# Patient Record
Sex: MALE | Race: WHITE | Employment: FULL TIME | URBAN - METROPOLITAN AREA
[De-identification: names, ages, dates, MRNs, and addresses within clinical notes are randomized per-mention and may not be internally consistent; named-entity substitution may affect disease eponyms.]

---

## 2021-09-28 ENCOUNTER — APPOINTMENT (OUTPATIENT)
Dept: CT IMAGING | Facility: CLINIC | Age: 61
End: 2021-09-28
Attending: EMERGENCY MEDICINE
Payer: COMMERCIAL

## 2021-09-28 ENCOUNTER — HOSPITAL ENCOUNTER (EMERGENCY)
Facility: CLINIC | Age: 61
Discharge: HOME | End: 2021-09-28
Attending: EMERGENCY MEDICINE | Admitting: EMERGENCY MEDICINE
Payer: COMMERCIAL

## 2021-09-28 VITALS
HEART RATE: 96 BPM | SYSTOLIC BLOOD PRESSURE: 131 MMHG | DIASTOLIC BLOOD PRESSURE: 78 MMHG | TEMPERATURE: 98.8 F | OXYGEN SATURATION: 97 % | RESPIRATION RATE: 16 BRPM

## 2021-09-28 DIAGNOSIS — K11.20 PAROTITIS: ICD-10-CM

## 2021-09-28 DIAGNOSIS — E04.1 THYROID NODULE: ICD-10-CM

## 2021-09-28 LAB
CREAT BLD-MCNC: 0.9 MG/DL (ref 0.7–1.3)
GFR SERPL CREATININE-BSD FRML MDRD: >60 ML/MIN/1.73M2

## 2021-09-28 PROCEDURE — 99285 EMERGENCY DEPT VISIT HI MDM: CPT | Mod: 25

## 2021-09-28 PROCEDURE — 82565 ASSAY OF CREATININE: CPT

## 2021-09-28 PROCEDURE — 70491 CT SOFT TISSUE NECK W/DYE: CPT | Mod: 59

## 2021-09-28 PROCEDURE — 250N000009 HC RX 250: Performed by: EMERGENCY MEDICINE

## 2021-09-28 PROCEDURE — 250N000011 HC RX IP 250 OP 636: Performed by: EMERGENCY MEDICINE

## 2021-09-28 RX ORDER — CEPHALEXIN 500 MG/1
500 CAPSULE ORAL 3 TIMES DAILY
Qty: 21 CAPSULE | Refills: 0 | Status: SHIPPED | OUTPATIENT
Start: 2021-09-28 | End: 2021-10-05

## 2021-09-28 RX ORDER — IOPAMIDOL 755 MG/ML
80 INJECTION, SOLUTION INTRAVASCULAR ONCE
Status: COMPLETED | OUTPATIENT
Start: 2021-09-28 | End: 2021-09-28

## 2021-09-28 RX ADMIN — IOPAMIDOL 80 ML: 755 INJECTION, SOLUTION INTRAVENOUS at 15:09

## 2021-09-28 RX ADMIN — SODIUM CHLORIDE 60 ML: 900 INJECTION INTRAVENOUS at 15:09

## 2021-09-28 ASSESSMENT — ENCOUNTER SYMPTOMS
FACIAL SWELLING: 1
DIAPHORESIS: 0
CHILLS: 0
FEVER: 0

## 2021-09-28 NOTE — ED PROVIDER NOTES
History   Chief Complaint:  Facial Swelling       The history is provided by the patient.      Heladio Matos is a 61 year old male who presents via EMS with facial swelling. Patient was eating lunch around 1230 at [facility] when he noticed a sudden onset of left sided facial swelling. He had chicken sandwich, fries, and coke at the time. He has had these in the past with no problem. No known food allergies. The swelling decreased after about an hour. He denies fevers, chills, or sweats. Patient is not on blood pressure medications. Denies tobacco use.       Review of Systems   Constitutional: Negative for chills, diaphoresis and fever.   HENT: Positive for facial swelling.    All other systems reviewed and are negative.      Allergies:  The patient has no known allergies.     Medications:  No current outpatient medications on file.    Past Medical History:    No past medical history on file.     Social History:  Patient presents to the ED via EMS.   Tobacco use: no    Physical Exam     Patient Vitals for the past 24 hrs:   BP Temp Temp src Pulse Resp SpO2   09/28/21 1454 -- -- -- -- -- 97 %   09/28/21 1447 -- -- -- -- -- 96 %   09/28/21 1446 -- -- -- -- -- 96 %   09/28/21 1417 -- -- -- -- -- 97 %   09/28/21 1404 -- -- -- -- -- 97 %   09/28/21 1345 -- 98.8  F (37.1  C) Oral -- -- 98 %   09/28/21 1340 -- -- -- -- 16 97 %   09/28/21 1339 131/78 -- -- 96 -- --       Physical Exam  General: Resting comfortably on the gurney  Head:  The scalp, face, and head appear normal. Except there is mild swelling to the left    parotid gland. There is no redness, tenderness, or mass palpated. No swelling to       the submandibular or sublingual glands.   Eyes:  The pupils are equal, round, and reactive to light    There is no nystagmus    Extraocular muscles are intact    Conjunctivae and sclerae are normal  ENT:    The nose is normal    Pinnae are normal    The oropharynx is normal    There is no purulence from ag's duct.  There is no sublingual indexation.    The posterior pharynx is normal. No stridor.     Uvula is in the midline  Neck:  Normal range of motion    There is no rigidity noted    There is no midline cervical spine pain/tenderness    Trachea is in the midline    No mass is detected  CV:  Regular rate and underlying rhythm     Normal S1/S2, no S3/S4    No pathological murmur detected  Skin:  No rash or acute skin lesions noted  Neuro: Speech is normal and fluent  Psych:  Awake. Alert.      Normal affect.  Appropriate interactions.  Lymph: No anterior cervical lymphadenopathy noted      Emergency Department Course     Imaging:  Soft tissue neck CT w contrast  1. Constellation of findings consistent with left parotitis without  evidence for obstructing salivary stone. No evidence for abscess.  2. 4 mm hypodense nodule in the right lobe of the thyroid gland.  3. Otherwise, normal soft tissue neck CT.  Reading per radiology.     Laboratory:  Creatinine POCT: 0.9    Emergency Department Course:    Reviewed:  I reviewed nursing notes and vitals    Assessments:  1411 I obtained history and examined the patient as noted above.   1559 I rechecked the patient and explained findings.     Disposition:  The patient was discharged to home.       Impression & Plan     CMS Diagnoses: None    Medical Decision Making:  This patient presents with left facial swelling as noted above.  He attributes this to eating this afternoon but he has no history of allergies to the foods that he was consuming (fast food).  The patient on clinical exam was noted to have a low-grade left parotid gland swelling.  No mass was palpated this was nontender.  There is no redness.  No systemic features of illness.  Examination was consistent with a low-grade left parotitis.  The differential diagnosis includes a recently passed or retained sialolith, viral infection and/or bacterial infection of the parotid gland.  The patient underwent CT scan of the soft tissue  neck to evaluate the parotid gland for mass or underlying calculus and the only findings were consistent with a mild inflammatory process of the left parotid gland.  The patient will be placed on lemon drops to stimulate salivation, he will also be placed empirically on oral antibiotics for good measure.  It is possible that the patient had some mild blockage of Stensen's duct and this cleared, it is also possible that he has an evolving parotitis, and simply noted this during lunch when it became slightly more swollen.  Patient will be given ENT follow-up if needed.  No other life-threatening etiologies are detected.  The patient was noted to have an incidental very tiny thyroid nodule at 4 mm.  I advised him that a follow-up ultrasound in 1year will be helpful.        Diagnosis:    ICD-10-CM    1. Parotitis  K11.20    2. Thyroid nodule  E04.1            Discharge Medications:  New Prescriptions    CEPHALEXIN (KEFLEX) 500 MG CAPSULE    Take 1 capsule (500 mg) by mouth 3 times daily for 7 days       Scribe Disclosure:  I, Jelly Rincon, am serving as a scribe at 2:11 PM on 9/28/2021 to document services personally performed by Bert Wills MD based on my observations and the provider's statements to me.          Bert Wills MD  09/28/21 0725

## 2021-09-28 NOTE — ED NOTES
Bed: ED19  Expected date:   Expected time:   Means of arrival:   Comments:  Rudi Crocker- sudden onset of left side facial swelling ETA 7 mins

## 2021-09-28 NOTE — ED TRIAGE NOTES
Pt eating [employer] for lunch, pt reports something he does often. Pt reports sudden onset of left cheek swelling. EMS called and swelling started to resolve. BS: 149. No medication given by EMS. Airway patent.

## 2021-09-28 NOTE — DISCHARGE INSTRUCTIONS
Suck on lemon drops or something similar to encourage salivation a few times per hour while awake.  Take Keflex 500 mg 3 times a day for a week  Gentle massage to the area that is swollen  You can try some warm washcloths to the area  If there is increasing swelling, fever, chills, or redness return to the emergency department.  If you have ongoing swelling follow-up with your nose and throat.    Follow-up thyroid ultrasound in 1 year.